# Patient Record
Sex: MALE | Race: WHITE | Employment: FULL TIME | URBAN - NONMETROPOLITAN AREA
[De-identification: names, ages, dates, MRNs, and addresses within clinical notes are randomized per-mention and may not be internally consistent; named-entity substitution may affect disease eponyms.]

---

## 2024-03-28 ENCOUNTER — APPOINTMENT (OUTPATIENT)
Dept: MRI IMAGING | Age: 24
DRG: 123 | End: 2024-03-28
Payer: COMMERCIAL

## 2024-03-28 ENCOUNTER — HOSPITAL ENCOUNTER (INPATIENT)
Age: 24
LOS: 2 days | Discharge: HOME | DRG: 123 | End: 2024-03-30
Attending: EMERGENCY MEDICINE | Admitting: HOSPITALIST
Payer: COMMERCIAL

## 2024-03-28 DIAGNOSIS — G35 MULTIPLE SCLEROSIS (HCC): ICD-10-CM

## 2024-03-28 DIAGNOSIS — H46.9 OPTIC NEURITIS, LEFT: ICD-10-CM

## 2024-03-28 DIAGNOSIS — H53.132 ACUTE LOSS OF VISION, LEFT: Primary | ICD-10-CM

## 2024-03-28 LAB
ALBUMIN SERPL-MCNC: 4.7 G/DL (ref 3.5–5.2)
ALP SERPL-CCNC: 76 U/L (ref 40–130)
ALT SERPL-CCNC: 39 U/L (ref 5–41)
ANION GAP SERPL CALCULATED.3IONS-SCNC: 10 MMOL/L (ref 7–19)
AST SERPL-CCNC: 22 U/L (ref 5–40)
BASOPHILS # BLD: 0.1 K/UL (ref 0–0.2)
BASOPHILS NFR BLD: 1.5 % (ref 0–1)
BILIRUB SERPL-MCNC: 0.3 MG/DL (ref 0.2–1.2)
BUN SERPL-MCNC: 11 MG/DL (ref 6–20)
CALCIUM SERPL-MCNC: 9.4 MG/DL (ref 8.6–10)
CHLORIDE SERPL-SCNC: 105 MMOL/L (ref 98–111)
CO2 SERPL-SCNC: 25 MMOL/L (ref 22–29)
CREAT SERPL-MCNC: 0.7 MG/DL (ref 0.5–1.2)
EOSINOPHIL # BLD: 0.2 K/UL (ref 0–0.6)
EOSINOPHIL NFR BLD: 3.2 % (ref 0–5)
ERYTHROCYTE [DISTWIDTH] IN BLOOD BY AUTOMATED COUNT: 12.4 % (ref 11.5–14.5)
GLUCOSE SERPL-MCNC: 105 MG/DL (ref 74–109)
HCT VFR BLD AUTO: 47.6 % (ref 42–52)
HGB BLD-MCNC: 15.9 G/DL (ref 14–18)
IMM GRANULOCYTES # BLD: 0 K/UL
LYMPHOCYTES # BLD: 1.9 K/UL (ref 1.1–4.5)
LYMPHOCYTES NFR BLD: 36 % (ref 20–40)
MCH RBC QN AUTO: 29 PG (ref 27–31)
MCHC RBC AUTO-ENTMCNC: 33.4 G/DL (ref 33–37)
MCV RBC AUTO: 86.7 FL (ref 80–94)
MONOCYTES # BLD: 0.5 K/UL (ref 0–0.9)
MONOCYTES NFR BLD: 8.6 % (ref 0–10)
NEUTROPHILS # BLD: 2.7 K/UL (ref 1.5–7.5)
NEUTS SEG NFR BLD: 50.1 % (ref 50–65)
PLATELET # BLD AUTO: 248 K/UL (ref 130–400)
PMV BLD AUTO: 10.1 FL (ref 9.4–12.4)
POTASSIUM SERPL-SCNC: 3.6 MMOL/L (ref 3.5–5)
PROT SERPL-MCNC: 7.4 G/DL (ref 6.6–8.7)
RBC # BLD AUTO: 5.49 M/UL (ref 4.7–6.1)
SODIUM SERPL-SCNC: 140 MMOL/L (ref 136–145)
WBC # BLD AUTO: 5.3 K/UL (ref 4.8–10.8)

## 2024-03-28 PROCEDURE — 6360000002 HC RX W HCPCS: Performed by: HOSPITALIST

## 2024-03-28 PROCEDURE — 99285 EMERGENCY DEPT VISIT HI MDM: CPT

## 2024-03-28 PROCEDURE — 1200000000 HC SEMI PRIVATE

## 2024-03-28 PROCEDURE — 85025 COMPLETE CBC W/AUTO DIFF WBC: CPT

## 2024-03-28 PROCEDURE — 70543 MRI ORBT/FAC/NCK W/O &W/DYE: CPT

## 2024-03-28 PROCEDURE — 6360000004 HC RX CONTRAST MEDICATION: Performed by: EMERGENCY MEDICINE

## 2024-03-28 PROCEDURE — 80053 COMPREHEN METABOLIC PANEL: CPT

## 2024-03-28 PROCEDURE — C9113 INJ PANTOPRAZOLE SODIUM, VIA: HCPCS | Performed by: HOSPITALIST

## 2024-03-28 PROCEDURE — 2580000003 HC RX 258: Performed by: HOSPITALIST

## 2024-03-28 PROCEDURE — 36415 COLL VENOUS BLD VENIPUNCTURE: CPT

## 2024-03-28 PROCEDURE — A9577 INJ MULTIHANCE: HCPCS | Performed by: EMERGENCY MEDICINE

## 2024-03-28 RX ORDER — POTASSIUM CHLORIDE 7.45 MG/ML
10 INJECTION INTRAVENOUS PRN
Status: DISCONTINUED | OUTPATIENT
Start: 2024-03-28 | End: 2024-03-30 | Stop reason: HOSPADM

## 2024-03-28 RX ORDER — POLYETHYLENE GLYCOL 3350 17 G/17G
17 POWDER, FOR SOLUTION ORAL DAILY PRN
Status: DISCONTINUED | OUTPATIENT
Start: 2024-03-28 | End: 2024-03-30 | Stop reason: HOSPADM

## 2024-03-28 RX ORDER — MAGNESIUM SULFATE IN WATER 40 MG/ML
2000 INJECTION, SOLUTION INTRAVENOUS PRN
Status: DISCONTINUED | OUTPATIENT
Start: 2024-03-28 | End: 2024-03-30 | Stop reason: HOSPADM

## 2024-03-28 RX ORDER — POTASSIUM CHLORIDE 20 MEQ/1
40 TABLET, EXTENDED RELEASE ORAL PRN
Status: DISCONTINUED | OUTPATIENT
Start: 2024-03-28 | End: 2024-03-30 | Stop reason: HOSPADM

## 2024-03-28 RX ORDER — SODIUM CHLORIDE 0.9 % (FLUSH) 0.9 %
5-40 SYRINGE (ML) INJECTION EVERY 12 HOURS SCHEDULED
Status: DISCONTINUED | OUTPATIENT
Start: 2024-03-28 | End: 2024-03-30 | Stop reason: HOSPADM

## 2024-03-28 RX ORDER — SODIUM CHLORIDE 9 MG/ML
INJECTION, SOLUTION INTRAVENOUS PRN
Status: DISCONTINUED | OUTPATIENT
Start: 2024-03-28 | End: 2024-03-30 | Stop reason: HOSPADM

## 2024-03-28 RX ORDER — ONDANSETRON 2 MG/ML
4 INJECTION INTRAMUSCULAR; INTRAVENOUS EVERY 6 HOURS PRN
Status: DISCONTINUED | OUTPATIENT
Start: 2024-03-28 | End: 2024-03-30 | Stop reason: HOSPADM

## 2024-03-28 RX ORDER — ACETAMINOPHEN 650 MG/1
650 SUPPOSITORY RECTAL EVERY 6 HOURS PRN
Status: DISCONTINUED | OUTPATIENT
Start: 2024-03-28 | End: 2024-03-30 | Stop reason: HOSPADM

## 2024-03-28 RX ORDER — SODIUM CHLORIDE 0.9 % (FLUSH) 0.9 %
5-40 SYRINGE (ML) INJECTION PRN
Status: DISCONTINUED | OUTPATIENT
Start: 2024-03-28 | End: 2024-03-30 | Stop reason: HOSPADM

## 2024-03-28 RX ORDER — ACETAMINOPHEN 325 MG/1
650 TABLET ORAL EVERY 6 HOURS PRN
Status: DISCONTINUED | OUTPATIENT
Start: 2024-03-28 | End: 2024-03-30 | Stop reason: HOSPADM

## 2024-03-28 RX ORDER — ENOXAPARIN SODIUM 100 MG/ML
30 INJECTION SUBCUTANEOUS 2 TIMES DAILY
Status: DISCONTINUED | OUTPATIENT
Start: 2024-03-28 | End: 2024-03-29

## 2024-03-28 RX ORDER — ONDANSETRON 4 MG/1
4 TABLET, ORALLY DISINTEGRATING ORAL EVERY 8 HOURS PRN
Status: DISCONTINUED | OUTPATIENT
Start: 2024-03-28 | End: 2024-03-30 | Stop reason: HOSPADM

## 2024-03-28 RX ADMIN — GADOBENATE DIMEGLUMINE 20 ML: 529 INJECTION, SOLUTION INTRAVENOUS at 17:28

## 2024-03-28 RX ADMIN — SODIUM CHLORIDE, PRESERVATIVE FREE 40 MG: 5 INJECTION INTRAVENOUS at 17:50

## 2024-03-28 ASSESSMENT — PAIN - FUNCTIONAL ASSESSMENT: PAIN_FUNCTIONAL_ASSESSMENT: NONE - DENIES PAIN

## 2024-03-28 ASSESSMENT — ENCOUNTER SYMPTOMS
SHORTNESS OF BREATH: 0
VOMITING: 0
ABDOMINAL PAIN: 0
ABDOMINAL DISTENTION: 0
COUGH: 0

## 2024-03-28 NOTE — H&P
HISTORY AND PHYSICAL               Date: 3/28/2024        Patient Name: Fuentes Short     YOB: 2000      Age:  23 y.o.      Chief Complaint     Chief Complaint   Patient presents with    Loss of Vision     Left eye loss of vision x a week, denies other symptoms. Seen at eye dr and told he has an inflamed optic nerve        History Obtained From   patient    History of Present Illness     Very pleasant 23-year-old male with no significant past medical history presenting to the hospital with concerns of left eye vision loss.  Patient stated symptoms started a week ago, was evaluated at outside facility emergency room on Friday and due to concerns of vision loss he was set up emergently to send ophthalmologist outpatient.  Patient saw ophthalmologist on Saturday, did some workup and studies done and concern for inflamed optic nerve.  Patient stated his vision progressively became worse, presented to the emergency room for further evaluation.  In the emergency room visual acuity test was done and patient unable to see from left thigh, emergency room physician discussed with neurologist who recommended high-dose steroids for concerns of optic neuritis, MRI of the brain as well as orbits and admitted for further evaluation.  Patient denies any family history of neurological disorder or MS.      Past Medical History   History reviewed. No pertinent past medical history.   None      Past Surgical History     Past Surgical History:   Procedure Laterality Date    KNEE SURGERY Left         Medications Prior to Admission     Prior to Admission medications    Not on File   None    Allergies   Patient has no known allergies.    Social History     Social History       Tobacco History       Smoking Status  Never      Smokeless Tobacco Use  Never              Alcohol History       Alcohol Use Status  Not Currently              Drug Use       Drug Use Status  Not Currently              Sexual Activity

## 2024-03-28 NOTE — PROGRESS NOTES
Visual acuity test performed. With both eyes pt was able to read up to line 8 without difficulty. Pt was then instructed to use his left eye only. Pt states that he was not able to read any of the lines on the chart. Pt states that it was too blurry.

## 2024-03-28 NOTE — ED PROVIDER NOTES
Pulmonary effort is normal. No respiratory distress.      Breath sounds: Normal breath sounds. No stridor.   Abdominal:      General: There is no distension.      Palpations: Abdomen is soft.      Tenderness: There is no abdominal tenderness. There is no guarding.   Skin:     Coloration: Skin is not pale.      Findings: No rash.   Neurological:      Mental Status: He is alert and oriented to person, place, and time.   Psychiatric:         Behavior: Behavior is cooperative.         DIAGNOSTIC RESULTS       LABS:  Labs Reviewed   CBC WITH AUTO DIFFERENTIAL - Abnormal; Notable for the following components:       Result Value    Basophils % 1.5 (*)     All other components within normal limits   COMPREHENSIVE METABOLIC PANEL       All other labs were within normal range or not returned as of this dictation.    EMERGENCY DEPARTMENT COURSE and DIFFERENTIAL DIAGNOSIS/MDM:   Vitals:    Vitals:    03/28/24 1039   BP: (!) 145/84   Pulse: 73   Resp: 15   Temp: 98.1 °F (36.7 °C)   TempSrc: Oral   SpO2: 100%   Weight: 115.7 kg (255 lb)   Height: 1.905 m (6' 3\")       MDM    I have independently reviewed patient's outpatient records from the ophthalmologist.      CONSULTS:    Case was discussed with Dr. Díaz regarding neurology consultation.  Has recommended admission to the hospitalist service with plans for MRI of the brain and orbits and high-dose IV steroids.    Case was discussed with Nik regarding admission to the hospitalist service.    PROCEDURES:  Unless otherwise noted below, none     Procedures    FINAL IMPRESSION      1. Acute loss of vision, left    2. Optic neuritis, left          DISPOSITION/PLAN   DISPOSITION Decision To Admit 03/28/2024 03:01:14 PM        (Please note that portions of this note were completed with a voice recognition program.  Efforts were made to edit thedictations but occasionally words are mis-transcribed.)    Torsten Conner MD (electronically signed)  Attending Emergency Physician           Torsten Conner MD  03/28/24 8249

## 2024-03-28 NOTE — PROGRESS NOTES
ED TO INPATIENT SBAR HANDOFF    Patient Name: Fuentes Short   : 2000  23 y.o.   Family/Caregiver Present: No  Code Status Order: No Order    C-SSRS: Risk of Suicide: No Risk  Sitter No  Restraints:         Situation  Chief Complaint:   Chief Complaint   Patient presents with    Loss of Vision     Left eye loss of vision x a week, denies other symptoms. Seen at eye dr and told he has an inflamed optic nerve     Patient Diagnosis: Optic neuritis [H46.9]     Brief Description of Patient's Condition: pt having difficulty with vision in left eye. Pt reports this has been going on for approximately 1 week. Pt states he has seen been seen previously at an outside facility regarding a possible inflamed optic nerve.   Mental Status: oriented, alert, coherent, logical, thought processes intact, and able to concentrate and follow conversation  Arrived from: home    Imaging:   MRI ORBITS FACE NECK W WO CONTRAST    (Results Pending)     COVID-19 Results:   Internal Administration   First Dose      Second Dose           Last COVID Lab No results found for: \"SARS-COV-2\", \"SARS-COV-2 RNA\", \"SARS-COV-2 RNA, RT PCR\", \"SARS-COV-2, MAXI\", \"SARS-COV-2, NAAT\", \"SARS-COV-2 BY PCR\", \"POC\", \"SARS-COV-2, POC\", \"RAPID\", \"SARS-COV-2, RAPID\", \"SALIVA\", \"SARS-COV-2, SALIVA\"        Abnormal labs:   Abnormal Labs Reviewed   CBC WITH AUTO DIFFERENTIAL - Abnormal; Notable for the following components:       Result Value    Basophils % 1.5 (*)     All other components within normal limits     Background  Allergies: No Known Allergies  Current Medications:     History: History reviewed. No pertinent past medical history.    Assessment  Vitals: Level of Consciousness: Alert (0)   Vitals:    24 1039   BP: (!) 145/84   Pulse: 73   Resp: 15   Temp: 98.1 °F (36.7 °C)   TempSrc: Oral   SpO2: 100%   Weight: 115.7 kg (255 lb)   Height: 1.905 m (6' 3\")     Predictive Model Details          14 (Normal)  Factor Value    Calculated 3/28/2024

## 2024-03-28 NOTE — PROGRESS NOTES
4 Eyes Skin Assessment     NAME:  Fuentes Short  YOB: 2000  MEDICAL RECORD NUMBER:  [id number]    The patient is being assessed for  Admission    I agree that at least one RN has performed a thorough Head to Toe Skin Assessment on the patient. ALL assessment sites listed below have been assessed.      Areas assessed by both nurses:    Head, Face, Ears, Shoulders, Back, Chest, Arms, Elbows, Hands, Sacrum. Buttock, Coccyx, Ischium, and Legs. Feet and Heels        Does the Patient have a Wound? No noted wound(s)       John Prevention initiated by RN: No  Wound Care Orders initiated by RN: No    Pressure Injury (Stage 3,4, Unstageable, DTI, NWPT, and Complex wounds) if present, place Wound referral order by RN under : No    New Ostomies, if present place, Ostomy referral order under : No     Nurse 1 eSignature: Electronically signed by Du Marcos RN on 3/28/24 at 5:01 PM CDT    **SHARE this note so that the co-signing nurse can place an eSignature**    Nurse 2 eSignature: {Esignature:[id number]}

## 2024-03-29 PROBLEM — H53.9 VISUAL DISTURBANCE: Status: ACTIVE | Noted: 2024-03-29

## 2024-03-29 PROBLEM — G35 MULTIPLE SCLEROSIS (HCC): Status: ACTIVE | Noted: 2024-03-29

## 2024-03-29 LAB
ANION GAP SERPL CALCULATED.3IONS-SCNC: 14 MMOL/L (ref 7–19)
APTT PPP: 26.1 SEC (ref 26–36.2)
BASOPHILS # BLD: 0 K/UL (ref 0–0.2)
BASOPHILS NFR BLD: 0.1 % (ref 0–1)
BUN SERPL-MCNC: 11 MG/DL (ref 6–20)
CALCIUM SERPL-MCNC: 9.5 MG/DL (ref 8.6–10)
CHLORIDE SERPL-SCNC: 103 MMOL/L (ref 98–111)
CO2 SERPL-SCNC: 23 MMOL/L (ref 22–29)
CREAT SERPL-MCNC: 0.8 MG/DL (ref 0.5–1.2)
EOSINOPHIL # BLD: 0 K/UL (ref 0–0.6)
EOSINOPHIL NFR BLD: 0.1 % (ref 0–5)
ERYTHROCYTE [DISTWIDTH] IN BLOOD BY AUTOMATED COUNT: 12.2 % (ref 11.5–14.5)
ERYTHROCYTE [SEDIMENTATION RATE] IN BLOOD BY WESTERGREN METHOD: 2 MM/HR (ref 0–10)
GLUCOSE SERPL-MCNC: 164 MG/DL (ref 74–109)
HBA1C MFR BLD: 5 % (ref 4–6)
HCT VFR BLD AUTO: 44.7 % (ref 42–52)
HGB BLD-MCNC: 15.2 G/DL (ref 14–18)
IMM GRANULOCYTES # BLD: 0 K/UL
INR PPP: 0.98 (ref 0.88–1.18)
LYMPHOCYTES # BLD: 0.7 K/UL (ref 1.1–4.5)
LYMPHOCYTES NFR BLD: 7.6 % (ref 20–40)
MCH RBC QN AUTO: 29.5 PG (ref 27–31)
MCHC RBC AUTO-ENTMCNC: 34 G/DL (ref 33–37)
MCV RBC AUTO: 86.8 FL (ref 80–94)
MONOCYTES # BLD: 0.1 K/UL (ref 0–0.9)
MONOCYTES NFR BLD: 0.5 % (ref 0–10)
NEUTROPHILS # BLD: 9 K/UL (ref 1.5–7.5)
NEUTS SEG NFR BLD: 91.3 % (ref 50–65)
PLATELET # BLD AUTO: 260 K/UL (ref 130–400)
PMV BLD AUTO: 9.8 FL (ref 9.4–12.4)
POTASSIUM SERPL-SCNC: 4.1 MMOL/L (ref 3.5–5)
PROTHROMBIN TIME: 12.7 SEC (ref 12–14.6)
RBC # BLD AUTO: 5.15 M/UL (ref 4.7–6.1)
RHEUMATOID FACT SER NEPH-ACNC: <10 IU/ML
SODIUM SERPL-SCNC: 140 MMOL/L (ref 136–145)
SPECIMEN SOURCE: NORMAL
T4 FREE SERPL-MCNC: 1.14 NG/DL (ref 0.93–1.7)
TSH SERPL DL<=0.005 MIU/L-ACNC: 0.38 UIU/ML (ref 0.27–4.2)
VIT B12 SERPL-MCNC: 592 PG/ML (ref 211–946)
WBC # BLD AUTO: 9.8 K/UL (ref 4.8–10.8)

## 2024-03-29 PROCEDURE — 87798 DETECT AGENT NOS DNA AMP: CPT

## 2024-03-29 PROCEDURE — 86618 LYME DISEASE ANTIBODY: CPT

## 2024-03-29 PROCEDURE — 84439 ASSAY OF FREE THYROXINE: CPT

## 2024-03-29 PROCEDURE — 36415 COLL VENOUS BLD VENIPUNCTURE: CPT

## 2024-03-29 PROCEDURE — 80048 BASIC METABOLIC PNL TOTAL CA: CPT

## 2024-03-29 PROCEDURE — 94760 N-INVAS EAR/PLS OXIMETRY 1: CPT

## 2024-03-29 PROCEDURE — 89050 BODY FLUID CELL COUNT: CPT

## 2024-03-29 PROCEDURE — 81241 F5 GENE: CPT

## 2024-03-29 PROCEDURE — 85025 COMPLETE CBC W/AUTO DIFF WBC: CPT

## 2024-03-29 PROCEDURE — 85610 PROTHROMBIN TIME: CPT

## 2024-03-29 PROCEDURE — 2580000003 HC RX 258: Performed by: PSYCHIATRY & NEUROLOGY

## 2024-03-29 PROCEDURE — 1200000000 HC SEMI PRIVATE

## 2024-03-29 PROCEDURE — 86431 RHEUMATOID FACTOR QUANT: CPT

## 2024-03-29 PROCEDURE — 85730 THROMBOPLASTIN TIME PARTIAL: CPT

## 2024-03-29 PROCEDURE — 82607 VITAMIN B-12: CPT

## 2024-03-29 PROCEDURE — 84425 ASSAY OF VITAMIN B-1: CPT

## 2024-03-29 PROCEDURE — 6360000002 HC RX W HCPCS: Performed by: PSYCHIATRY & NEUROLOGY

## 2024-03-29 PROCEDURE — 2580000003 HC RX 258: Performed by: HOSPITALIST

## 2024-03-29 PROCEDURE — C9113 INJ PANTOPRAZOLE SODIUM, VIA: HCPCS | Performed by: HOSPITALIST

## 2024-03-29 PROCEDURE — 84443 ASSAY THYROID STIM HORMONE: CPT

## 2024-03-29 PROCEDURE — 86160 COMPLEMENT ANTIGEN: CPT

## 2024-03-29 PROCEDURE — 83036 HEMOGLOBIN GLYCOSYLATED A1C: CPT

## 2024-03-29 PROCEDURE — 99223 1ST HOSP IP/OBS HIGH 75: CPT | Performed by: PSYCHIATRY & NEUROLOGY

## 2024-03-29 PROCEDURE — 6360000002 HC RX W HCPCS: Performed by: HOSPITALIST

## 2024-03-29 PROCEDURE — 86038 ANTINUCLEAR ANTIBODIES: CPT

## 2024-03-29 PROCEDURE — 6370000000 HC RX 637 (ALT 250 FOR IP): Performed by: HOSPITALIST

## 2024-03-29 PROCEDURE — 86225 DNA ANTIBODY NATIVE: CPT

## 2024-03-29 PROCEDURE — 85652 RBC SED RATE AUTOMATED: CPT

## 2024-03-29 PROCEDURE — 83921 ORGANIC ACID SINGLE QUANT: CPT

## 2024-03-29 RX ORDER — SODIUM CHLORIDE 0.9 % (FLUSH) 0.9 %
5-40 SYRINGE (ML) INJECTION EVERY 12 HOURS SCHEDULED
Status: DISCONTINUED | OUTPATIENT
Start: 2024-03-29 | End: 2024-03-30 | Stop reason: HOSPADM

## 2024-03-29 RX ORDER — SODIUM CHLORIDE 9 MG/ML
INJECTION, SOLUTION INTRAVENOUS PRN
Status: DISCONTINUED | OUTPATIENT
Start: 2024-03-29 | End: 2024-03-30 | Stop reason: HOSPADM

## 2024-03-29 RX ORDER — SODIUM CHLORIDE 0.9 % (FLUSH) 0.9 %
5-40 SYRINGE (ML) INJECTION PRN
Status: DISCONTINUED | OUTPATIENT
Start: 2024-03-29 | End: 2024-03-30 | Stop reason: HOSPADM

## 2024-03-29 RX ORDER — LORAZEPAM 2 MG/ML
2 INJECTION INTRAMUSCULAR ONCE
Status: DISCONTINUED | OUTPATIENT
Start: 2024-03-29 | End: 2024-03-30 | Stop reason: HOSPADM

## 2024-03-29 RX ORDER — NIFEDIPINE 30 MG/1
30 TABLET, EXTENDED RELEASE ORAL DAILY
Status: DISCONTINUED | OUTPATIENT
Start: 2024-03-29 | End: 2024-03-30 | Stop reason: HOSPADM

## 2024-03-29 RX ADMIN — METHYLPREDNISOLONE SODIUM SUCCINATE 1000 MG: 1 INJECTION INTRAMUSCULAR; INTRAVENOUS at 12:37

## 2024-03-29 RX ADMIN — NIFEDIPINE 30 MG: 30 TABLET, EXTENDED RELEASE ORAL at 10:03

## 2024-03-29 RX ADMIN — SODIUM CHLORIDE, PRESERVATIVE FREE 40 MG: 5 INJECTION INTRAVENOUS at 05:28

## 2024-03-29 RX ADMIN — SODIUM CHLORIDE, PRESERVATIVE FREE 10 ML: 5 INJECTION INTRAVENOUS at 21:00

## 2024-03-29 RX ADMIN — SODIUM CHLORIDE, PRESERVATIVE FREE 40 MG: 5 INJECTION INTRAVENOUS at 16:56

## 2024-03-29 NOTE — CONSULTS
[facility] Consult        Patient:   Fuentes Short  MR#:    [id number]  Account Number:                   [id number]      Room:    0519/519-01   YOB: 2000  Date of Progress Note: 3/29/2024  Time of Note                           7:49 AM  Attending Physician:  Danyell Yi MD  Consulting Physician:   Nithin Díaz M.D.        CHIEF COMPLAINT: Left optic neuritis      HISTORY OF PRESENT ILLNESS:   This 23 y.o. male with no significant past medical history seen for evaluation of left-sided optic neuritis.  Approximately 1 week prior to admission he woke up with blurring of vision in the left eye.  There was haziness not worsened with time.  He went to an ophthalmologist and workup appeared to be consistent with optic neuritis.  He denies some eye pain on the left with movement.  He denies diplopia, dysarthria, dysphagia, weakness, numbness, ataxia or confusion.  He denies any bowel or bladder issues.  He denies any previous history of neurological dysfunction.  He came in for evaluation.  There is no family history of neurological dysfunction.  There is no family history of MS.    REVIEW OF SYSTEMS:  Constitutional - No fever or chills.  No diaphoresis or significant fatigue.  HENT -  No tinnitus or significant hearing loss.  Eyes - no sudden vision change or eye pain  Respiratory - no significant shortness of breath or cough  Cardiovascular - no chest pain No palpitations or significant leg swelling  Gastrointestinal - no abdominal swelling or pain.    Genitourinary - No difficulty urinating, dysuria  Musculoskeletal - no back pain or myalgia.  Skin - no color change or rash  Neurologic - No seizures.  No lateralizing weakness.  Hematologic - no easy bruising or excessive bleeding.  Psychiatric - no severe anxiety or nervousness.   All other review of systems are negative.      Past Medical History:  History reviewed. No pertinent past medical history.    Past Surgical History:     Neurophysiology  Fellowship Trained in Clinical Neurophysiology    EMR Dragon/transcription disclaimer:Significant part of this  encounter note is electronic transcription/translation of spoken language to printed text. The electronic translation of spoken language may be erroneous, or at times, nonsensical words or phrases may be inadvertently transcribed. Although I have reviewed the note for such errors, some may still exist.

## 2024-03-29 NOTE — PROGRESS NOTES
Progress Note    Date:3/29/2024       Room:0519/519-01  Patient Name:Fuentes Short     YOB: 2000     Age:23 y.o.      Subjective    Subjective: 23-year-old male with no significant past medical history presenting to the hospital with concerns of left eye vision loss. Patient saw ophthalmologist on Saturday, did some workup and studies done and concern for inflamed optic nerve. In the emergency room visual acuity test was done and patient unable to see from left eye, concerns for optic neuritis high-dose steroids was given. MRI of the brain / orbits obtained and revealed left optic neuritis with suspicion of MS. Evaluated by neurology, patient will need IV steroids for 3 days, LP and further workup.    Seen in house this morning no family member present, denied any acute overnight event, still having issues with vision from left eye.  No other new issues at this time.  Currently awaiting LP.     Review of Systems: 12 point system review negative except as stated above.    Objective         Vitals Last 24 Hours:  TEMPERATURE:  Temp  Av.9 °F (36.6 °C)  Min: 97.6 °F (36.4 °C)  Max: 98.1 °F (36.7 °C)  RESPIRATIONS RANGE: Resp  Av  Min: 16  Max: 18  PULSE OXIMETRY RANGE: SpO2  Av.8 %  Min: 95 %  Max: 99 %  PULSE RANGE: Pulse  Av.8  Min: 55  Max: 116  BLOOD PRESSURE RANGE: Systolic (24hrs), Av , Min:132 , Max:165   ; Diastolic (24hrs), Av, Min:68, Max:82    I/O (24Hr):    Intake/Output Summary (Last 24 hours) at 3/29/2024 1122  Last data filed at 3/29/2024 1006  Gross per 24 hour   Intake 360 ml   Output --   Net 360 ml       Physical Examination:  General: Well-developed, no acute distress lying comfortably in bed.  HEENT: Atraumatic normocephalic, range of motion normal, no JVD, no tracheal deviation noted.  Cardiac: Normal S1-S2 no murmurs rub or gallop.  Respiratory: clear To auscultation bilaterally, no rhonchi or rales, no wheezing  Abdomen: Soft, positive bowel sounds

## 2024-03-29 NOTE — PROGRESS NOTES
This RN notified at 1230 by radiology that pt did not have coag studies completed that were required prior to LP. Stat labs ordered at that time. See lab results timed 1320. Pt informed at time of second lab draw today that labs were required prior to LP. Pt and mother in agreement with plan to proceed with LP today.   RN contacted at 1450 that patient cannot have LP today due to late results of coag studies and that radiologist was leaving early for personal reasons.   RN contacted Dr. Yi at 1455 that LP cannot be completed today and notified Dr. Díaz at 1455 of above information.   Patient and his mother verbalized understanding and made it known that they are nervous about diagnosis and are hoping to have LP prior to DC so that they can have a better understanding of how to proceed with any new diagnosis.

## 2024-03-29 NOTE — PROGRESS NOTES
Spoke with nurse regarding PT, PTT, and INR orders at 1217. She put these orders in STAT. Results came back around 1420. I noticed the results at 1430. The radiologist had to leave early today for a doctor's appointment and is not available to do the lumbar puncture.

## 2024-03-30 VITALS
WEIGHT: 255 LBS | TEMPERATURE: 98.1 F | RESPIRATION RATE: 16 BRPM | HEART RATE: 73 BPM | DIASTOLIC BLOOD PRESSURE: 90 MMHG | BODY MASS INDEX: 31.71 KG/M2 | OXYGEN SATURATION: 100 % | SYSTOLIC BLOOD PRESSURE: 148 MMHG | HEIGHT: 75 IN

## 2024-03-30 LAB
ANION GAP SERPL CALCULATED.3IONS-SCNC: 16 MMOL/L (ref 7–19)
APPEARANCE CSF: CLEAR
BASOPHILS # BLD: 0 K/UL (ref 0–0.2)
BASOPHILS NFR BLD: 0.1 % (ref 0–1)
BUN SERPL-MCNC: 11 MG/DL (ref 6–20)
C3 SERPL-MCNC: 97 MG/DL (ref 90–180)
C4 SERPL-MCNC: 25 MG/DL (ref 10–40)
CALCIUM SERPL-MCNC: 9.4 MG/DL (ref 8.6–10)
CHLORIDE SERPL-SCNC: 107 MMOL/L (ref 98–111)
CLOT EVALUATION CSF: NORMAL
CO2 SERPL-SCNC: 20 MMOL/L (ref 22–29)
COLOR CSF: COLORLESS
CREAT SERPL-MCNC: 0.7 MG/DL (ref 0.5–1.2)
CRYPTOC AG CSF QL: NEGATIVE
EOSINOPHIL # BLD: 0 K/UL (ref 0–0.6)
EOSINOPHIL NFR BLD: 0 % (ref 0–5)
ERYTHROCYTE [DISTWIDTH] IN BLOOD BY AUTOMATED COUNT: 12.5 % (ref 11.5–14.5)
GLUCOSE CSF-MCNC: 110 MG/DL (ref 40–70)
GLUCOSE SERPL-MCNC: 259 MG/DL (ref 74–109)
HCT VFR BLD AUTO: 46 % (ref 42–52)
HGB BLD-MCNC: 15.1 G/DL (ref 14–18)
IMM GRANULOCYTES # BLD: 0.2 K/UL
KOH PREP SPEC: NORMAL
LYMPHOCYTES # BLD: 0.9 K/UL (ref 1.1–4.5)
LYMPHOCYTES NFR BLD: 6.1 % (ref 20–40)
MANUAL DIF COMMENT CSF-IMP: NORMAL
MCH RBC QN AUTO: 28.8 PG (ref 27–31)
MCHC RBC AUTO-ENTMCNC: 32.8 G/DL (ref 33–37)
MCV RBC AUTO: 87.8 FL (ref 80–94)
MONOCYTES # BLD: 0.7 K/UL (ref 0–0.9)
MONOCYTES NFR BLD: 4.7 % (ref 0–10)
NEUTROPHILS # BLD: 12.6 K/UL (ref 1.5–7.5)
NEUTS SEG NFR BLD: 87.8 % (ref 50–65)
NUCLEAR IGG SER QL IA: NORMAL
PLATELET # BLD AUTO: 243 K/UL (ref 130–400)
PMV BLD AUTO: 10 FL (ref 9.4–12.4)
POTASSIUM SERPL-SCNC: 4.1 MMOL/L (ref 3.5–5)
PROT CSF-MCNC: 39 MG/DL (ref 15–45)
RBC # BLD AUTO: 5.24 M/UL (ref 4.7–6.1)
RBC CSF: 1 /CUMM (ref 0–5)
RHEUMATOID FACT SER NEPH-ACNC: <10 IU/ML (ref 0–14)
SODIUM SERPL-SCNC: 143 MMOL/L (ref 136–145)
SPECIMEN SOURCE: NORMAL
TUBE # CSF: NORMAL
WBC # BLD AUTO: 14.4 K/UL (ref 4.8–10.8)
WBC CSF: 1 /CUMM (ref 0–8)

## 2024-03-30 PROCEDURE — 86651 ENCEPHALITIS CALIFORN ANTBDY: CPT

## 2024-03-30 PROCEDURE — 82042 OTHER SOURCE ALBUMIN QUAN EA: CPT

## 2024-03-30 PROCEDURE — 99233 SBSQ HOSP IP/OBS HIGH 50: CPT | Performed by: PSYCHIATRY & NEUROLOGY

## 2024-03-30 PROCEDURE — 6370000000 HC RX 637 (ALT 250 FOR IP): Performed by: HOSPITALIST

## 2024-03-30 PROCEDURE — 80048 BASIC METABOLIC PNL TOTAL CA: CPT

## 2024-03-30 PROCEDURE — 87205 SMEAR GRAM STAIN: CPT

## 2024-03-30 PROCEDURE — 84157 ASSAY OF PROTEIN OTHER: CPT

## 2024-03-30 PROCEDURE — 6360000002 HC RX W HCPCS: Performed by: HOSPITALIST

## 2024-03-30 PROCEDURE — 82040 ASSAY OF SERUM ALBUMIN: CPT

## 2024-03-30 PROCEDURE — 36415 COLL VENOUS BLD VENIPUNCTURE: CPT

## 2024-03-30 PROCEDURE — 87899 AGENT NOS ASSAY W/OPTIC: CPT

## 2024-03-30 PROCEDURE — 87102 FUNGUS ISOLATION CULTURE: CPT

## 2024-03-30 PROCEDURE — 2580000003 HC RX 258: Performed by: HOSPITALIST

## 2024-03-30 PROCEDURE — 82945 GLUCOSE OTHER FLUID: CPT

## 2024-03-30 PROCEDURE — 87075 CULTR BACTERIA EXCEPT BLOOD: CPT

## 2024-03-30 PROCEDURE — 86789 WEST NILE VIRUS ANTIBODY: CPT

## 2024-03-30 PROCEDURE — 82784 ASSAY IGA/IGD/IGG/IGM EACH: CPT

## 2024-03-30 PROCEDURE — 2580000003 HC RX 258: Performed by: PSYCHIATRY & NEUROLOGY

## 2024-03-30 PROCEDURE — 009U3ZX DRAINAGE OF SPINAL CANAL, PERCUTANEOUS APPROACH, DIAGNOSTIC: ICD-10-PCS | Performed by: INTERNAL MEDICINE

## 2024-03-30 PROCEDURE — C9113 INJ PANTOPRAZOLE SODIUM, VIA: HCPCS | Performed by: HOSPITALIST

## 2024-03-30 PROCEDURE — 86654 ENCEPHALTIS WEST EQNE ANTBDY: CPT

## 2024-03-30 PROCEDURE — 62270 DX LMBR SPI PNXR: CPT | Performed by: INTERNAL MEDICINE

## 2024-03-30 PROCEDURE — 85025 COMPLETE CBC W/AUTO DIFF WBC: CPT

## 2024-03-30 PROCEDURE — 86652 ENCEPHALTIS EAST EQNE ANBDY: CPT

## 2024-03-30 PROCEDURE — 83916 OLIGOCLONAL BANDS: CPT

## 2024-03-30 PROCEDURE — 6360000002 HC RX W HCPCS: Performed by: PSYCHIATRY & NEUROLOGY

## 2024-03-30 PROCEDURE — 86653 ENCEPHALTIS ST LOUIS ANTBODY: CPT

## 2024-03-30 PROCEDURE — 87070 CULTURE OTHR SPECIMN AEROBIC: CPT

## 2024-03-30 RX ORDER — METHYLPREDNISOLONE 4 MG/1
TABLET ORAL
Qty: 1 KIT | Refills: 0 | Status: SHIPPED | OUTPATIENT
Start: 2024-03-30 | End: 2024-03-30

## 2024-03-30 RX ORDER — METHYLPREDNISOLONE 4 MG/1
TABLET ORAL
Qty: 1 KIT | Refills: 0 | Status: SHIPPED | OUTPATIENT
Start: 2024-03-30 | End: 2024-04-05

## 2024-03-30 RX ORDER — NIFEDIPINE 30 MG/1
30 TABLET, EXTENDED RELEASE ORAL DAILY
Qty: 30 TABLET | Refills: 3 | Status: SHIPPED | OUTPATIENT
Start: 2024-03-30

## 2024-03-30 RX ADMIN — METHYLPREDNISOLONE SODIUM SUCCINATE 1000 MG: 1 INJECTION INTRAMUSCULAR; INTRAVENOUS at 10:28

## 2024-03-30 RX ADMIN — SODIUM CHLORIDE, PRESERVATIVE FREE 10 ML: 5 INJECTION INTRAVENOUS at 06:37

## 2024-03-30 RX ADMIN — SODIUM CHLORIDE, PRESERVATIVE FREE 40 MG: 5 INJECTION INTRAVENOUS at 06:37

## 2024-03-30 RX ADMIN — SODIUM CHLORIDE, PRESERVATIVE FREE 10 ML: 5 INJECTION INTRAVENOUS at 10:33

## 2024-03-30 RX ADMIN — NIFEDIPINE 30 MG: 30 TABLET, EXTENDED RELEASE ORAL at 10:27

## 2024-03-30 NOTE — PROCEDURES
PROCEDURE NOTE  Date: 3/30/2024   Name: Fuentes Short  YOB: 2000    Lumbar Puncture    Date/Time: 3/30/2024 12:19 PM    Performed by: Afia Rodriguez MD  Authorized by: Nithin Díaz MD  Consent: Verbal consent obtained. Written consent obtained.  Risks and benefits: risks, benefits and alternatives were discussed  Consent given by: patient  Patient understanding: patient states understanding of the procedure being performed  Patient consent: the patient's understanding of the procedure matches consent given  Procedure consent: procedure consent matches procedure scheduled  Relevant documents: relevant documents present and verified  Test results: test results available and properly labeled  Site marked: the operative site was marked  Imaging studies: imaging studies available  Required items: required blood products, implants, devices, and special equipment available  Patient identity confirmed: verbally with patient and hospital-assigned identification number  Indications: evaluation for infection (also clinical and MRI concern for Multiple Sclerosis new diagnosis.)  Anesthesia: local infiltration    Anesthesia:  Local Anesthetic: lidocaine 1% without epinephrine  Anesthetic total: 3 mL    Sedation:  Patient sedated: no    Preparation: Patient was prepped and draped in the usual sterile fashion.  Lumbar space: L3-L4 interspace  Patient's position: left lateral decubitus  Needle gauge: 18  Needle type: spinal needle - Quincke tip  Needle length: 2.5 in  Number of attempts: 2  Fluid appearance: clear and blood-tinged then clearing  Tubes of fluid: 4  Total volume: 29 ml  Post-procedure: site cleaned, pressure dressing applied and adhesive bandage applied  Patient tolerance: patient tolerated the procedure well with no immediate complications

## 2024-03-30 NOTE — PLAN OF CARE
Problem: Discharge Planning  Goal: Discharge to home or other facility with appropriate resources  3/30/2024 1419 by Gina Berger RN  Outcome: Progressing  3/30/2024 0249 by Cindy Santana RN  Outcome: Progressing     Problem: Safety - Adult  Goal: Free from fall injury  3/30/2024 1419 by Gina Berger RN  Outcome: Progressing  3/30/2024 0249 by Cindy Santana, RN  Outcome: Progressing     Problem: Neurosensory - Adult  Goal: Achieves stable or improved neurological status  3/30/2024 1419 by Gina Berger RN  Outcome: Progressing  3/30/2024 0249 by Cindy Santana, RN  Outcome: Progressing

## 2024-03-30 NOTE — DISCHARGE SUMMARY
Discharge Summary    Date:3/30/2024        Patient Name:Fuentes Short     YOB: 2000     Age:23 y.o.    Admit Date:3/28/2024   Admission Condition:fair   Discharged Condition:fair  Discharge Date: 03/30/24       Discharge Diagnoses   Principal Problem:    Optic neuritis  Active Problems:    Multiple sclerosis (HCC)    Visual disturbance  Resolved Problems:    * No resolved hospital problems. *      Hospital Stay   Narrative of Hospital Course:     23-year-old male with no significant past medical history presenting to the hospital with concerns of left eye vision loss. Patient saw ophthalmologist prior to presentation, did some workup and studies and concern for inflamed optic nerve. In the emergency room visual acuity test was done and patient unable to see from left eye, concerns for optic neuritis high-dose steroids was given. MRI of the brain / orbits obtained and revealed left optic neuritis with suspicion of MS. Evaluated by neurology, patient completed IV steroids for 3 days, LP done by Hospitalist (Dr HERRERA). Patent discharged home to follow up with neurology outpt for LP results, and initiation of tx for MS. Discharged on medrol dose pack. Was started on nifedipine for increased BP in house. To follow up with PCP.      Physical Examination:  General: Well-developed, no acute distress lying comfortably in bed.  HEENT: Atraumatic normocephalic, range of motion normal, no JVD, no tracheal deviation noted.  Cardiac: Normal S1-S2 no murmurs rub or gallop.  Respiratory: clear To auscultation bilaterally, no rhonchi or rales, no wheezing  Abdomen: Soft, positive bowel sounds in all quadrants, no distention, nontender to palpation, no organomegaly noted, no rebound noted.    extremities: no tenderness, no edema, moves all extremities  Psych: Affect normal and good eye contact, behavioral normal.  Neuro No focal deficit except left eye vision loss        Consultants:   IP CONSULT TO NEUROLOGY  IP

## 2024-03-30 NOTE — PROGRESS NOTES
Patient:   Fuentes Short  MR#:    [id number]   Room:    19/519-01   YOB: 2000  Date of Progress Note: 3/30/2024  Time of Note                           8:17 AM  Consulting Physician:   Nithin Díaz M.D.  Attending Physician:  Danyell Yi MD     Chief complaint Left optic neuritis     S:This 23 y.o. male  with no significant past medical history seen for evaluation of left-sided optic neuritis.  Approximately 1 week prior to admission he woke up with blurring of vision in the left eye.  There was haziness not worsened with time.  He went to an ophthalmologist and workup appeared to be consistent with optic neuritis.  He denies some eye pain on the left with movement.  He denies diplopia, dysarthria, dysphagia, weakness, numbness, ataxia or confusion.  He denies any bowel or bladder issues.  He denies any previous history of neurological dysfunction.  He came in for evaluation.  There is no family history of neurological dysfunction.  There is no family history of MS. no complaints overnight.  Feeling well.    REVIEW OF SYSTEMS:  Constitutional: No fevers No chills  Neck:No stiffness  Respiratory: No shortness of breath  Cardiovascular: No chest pain No palpitations  Gastrointestinal: No abdominal pain    Genitourinary: No Dysuria  Neurological: No headache, no confusion    Past Medical History:  History reviewed. No pertinent past medical history.    Past Surgical History:      Procedure Laterality Date    KNEE SURGERY Left        Medications in Hospital:      Current Facility-Administered Medications:     methylPREDNISolone sodium (SOLU-MEDROL) 1,000 mg in sodium chloride 0.9 % 250 mL IVPB, 1,000 mg, IntraVENous, Daily, Nithin Díaz MD, Stopped at 03/29/24 1307    LORazepam (ATIVAN) injection 2 mg, 2 mg, IntraVENous, Once, Nithin Díaz MD    sodium chloride flush 0.9 % injection 5-40 mL, 5-40 mL, IntraVENous, 2 times per day, Nithin Díaz MD, 10 mL at 03/29/24 2100    sodium

## 2024-03-31 LAB
B BURGDOR.VLSE1+PEPC10 AB SER IA-ACNC: 0.32 IV
BACTERIA CSF CULT: NORMAL
BACTERIA SPEC ANAEROBE CULT: NORMAL
GRAM STN SPEC: NORMAL

## 2024-04-01 ENCOUNTER — TELEPHONE (OUTPATIENT)
Dept: NEUROLOGY | Age: 24
End: 2024-04-01

## 2024-04-01 LAB
F5 P.R506Q BLD/T QL: NEGATIVE
METHYLMALONATE SERPL-SCNC: <0.1 UMOL/L (ref 0–0.4)
SPECIMEN SOURCE: NORMAL

## 2024-04-01 NOTE — TELEPHONE ENCOUNTER
Fuentes's mom called to schedule an appointment for  HFU this week . [facility] was unable to accommodate in the time frame needed. Please be advised that the best time to call Anytime.    Thank you.

## 2024-04-02 LAB — VIT B1 SERPL-MCNC: 4 NMOL/L (ref 4–15)

## 2024-04-03 LAB
SPECIMEN SOURCE: NORMAL
VZV DNA SPEC QL NAA+PROBE: NOT DETECTED

## 2024-04-04 ENCOUNTER — OFFICE VISIT (OUTPATIENT)
Dept: NEUROLOGY | Age: 24
End: 2024-04-04
Payer: COMMERCIAL

## 2024-04-04 VITALS
HEIGHT: 75 IN | WEIGHT: 255 LBS | DIASTOLIC BLOOD PRESSURE: 94 MMHG | BODY MASS INDEX: 31.71 KG/M2 | HEART RATE: 87 BPM | SYSTOLIC BLOOD PRESSURE: 135 MMHG

## 2024-04-04 DIAGNOSIS — G35 MULTIPLE SCLEROSIS (HCC): Primary | ICD-10-CM

## 2024-04-04 DIAGNOSIS — Z09 HOSPITAL DISCHARGE FOLLOW-UP: ICD-10-CM

## 2024-04-04 DIAGNOSIS — H46.9 OPTIC NEURITIS: ICD-10-CM

## 2024-04-04 DIAGNOSIS — G35 MULTIPLE SCLEROSIS (HCC): ICD-10-CM

## 2024-04-04 DIAGNOSIS — H53.9 VISUAL DISTURBANCE: ICD-10-CM

## 2024-04-04 LAB
ALBUMIN SERPL-MCNC: 4.6 G/DL (ref 3.5–5.2)
ALP SERPL-CCNC: 71 U/L (ref 40–130)
ALT SERPL-CCNC: 49 U/L (ref 5–41)
ANION GAP SERPL CALCULATED.3IONS-SCNC: 11 MMOL/L (ref 7–19)
AST SERPL-CCNC: 14 U/L (ref 5–40)
BASOPHILS # BLD: 0.1 K/UL (ref 0–0.2)
BASOPHILS NFR BLD: 1 % (ref 0–1)
BILIRUB SERPL-MCNC: 0.6 MG/DL (ref 0.2–1.2)
BUN SERPL-MCNC: 18 MG/DL (ref 6–20)
CALCIUM SERPL-MCNC: 9.4 MG/DL (ref 8.6–10)
CHLORIDE SERPL-SCNC: 103 MMOL/L (ref 98–111)
CO2 SERPL-SCNC: 26 MMOL/L (ref 22–29)
CREAT SERPL-MCNC: 0.7 MG/DL (ref 0.5–1.2)
DSDNA IGG SER QL IA: 1.5 IU/ML
EOSINOPHIL # BLD: 0.2 K/UL (ref 0–0.6)
EOSINOPHIL NFR BLD: 1.3 % (ref 0–5)
ERYTHROCYTE [DISTWIDTH] IN BLOOD BY AUTOMATED COUNT: 13 % (ref 11.5–14.5)
GLUCOSE SERPL-MCNC: 100 MG/DL (ref 74–109)
HBV SURFACE AB SERPL IA-ACNC: NORMAL M[IU]/ML
HBV SURFACE AG SERPL QL IA: NORMAL
HCT VFR BLD AUTO: 53.3 % (ref 42–52)
HCV AB SERPL QL IA: NORMAL
HGB BLD-MCNC: 18.1 G/DL (ref 14–18)
IGG SERPL-MCNC: 1223 MG/DL (ref 700–1600)
IGM SERPL-MCNC: 92 MG/DL (ref 40–230)
IMM GRANULOCYTES # BLD: 0.5 K/UL
LYMPHOCYTES # BLD: 2.6 K/UL (ref 1.1–4.5)
LYMPHOCYTES NFR BLD: 21.2 % (ref 20–40)
Lab: NORMAL
MCH RBC QN AUTO: 29.4 PG (ref 27–31)
MCHC RBC AUTO-ENTMCNC: 34 G/DL (ref 33–37)
MCV RBC AUTO: 86.7 FL (ref 80–94)
MONOCYTES # BLD: 0.7 K/UL (ref 0–0.9)
MONOCYTES NFR BLD: 5.9 % (ref 0–10)
NEUTROPHILS # BLD: 8.3 K/UL (ref 1.5–7.5)
NEUTS SEG NFR BLD: 66.7 % (ref 50–65)
PLATELET # BLD AUTO: 289 K/UL (ref 130–400)
PMV BLD AUTO: 9.4 FL (ref 9.4–12.4)
POTASSIUM SERPL-SCNC: 4.6 MMOL/L (ref 3.5–5)
PROT SERPL-MCNC: 7.7 G/DL (ref 6.6–8.7)
RBC # BLD AUTO: 6.15 M/UL (ref 4.7–6.1)
REPORT: NORMAL
SODIUM SERPL-SCNC: 140 MMOL/L (ref 136–145)
THIS TEST SENT TO: NORMAL
WBC # BLD AUTO: 12.4 K/UL (ref 4.8–10.8)

## 2024-04-04 PROCEDURE — 99214 OFFICE O/P EST MOD 30 MIN: CPT | Performed by: PSYCHIATRY & NEUROLOGY

## 2024-04-04 PROCEDURE — 1111F DSCHRG MED/CURRENT MED MERGE: CPT | Performed by: PSYCHIATRY & NEUROLOGY

## 2024-04-04 RX ORDER — ACETAMINOPHEN 325 MG/1
650 TABLET ORAL
OUTPATIENT
Start: 2024-04-04

## 2024-04-04 RX ORDER — DIPHENHYDRAMINE HYDROCHLORIDE 50 MG/ML
25 INJECTION INTRAMUSCULAR; INTRAVENOUS ONCE
OUTPATIENT
Start: 2024-04-04 | End: 2024-04-04

## 2024-04-04 RX ORDER — ACETAMINOPHEN 325 MG/1
650 TABLET ORAL ONCE
OUTPATIENT
Start: 2024-04-04 | End: 2024-04-04

## 2024-04-04 RX ORDER — SODIUM CHLORIDE 9 MG/ML
INJECTION, SOLUTION INTRAVENOUS CONTINUOUS
OUTPATIENT
Start: 2024-04-04

## 2024-04-04 RX ORDER — ONDANSETRON 2 MG/ML
8 INJECTION INTRAMUSCULAR; INTRAVENOUS
OUTPATIENT
Start: 2024-04-04

## 2024-04-04 RX ORDER — SODIUM CHLORIDE 9 MG/ML
5-250 INJECTION, SOLUTION INTRAVENOUS PRN
OUTPATIENT
Start: 2024-04-04

## 2024-04-04 RX ORDER — SODIUM CHLORIDE 0.9 % (FLUSH) 0.9 %
5-40 SYRINGE (ML) INJECTION PRN
OUTPATIENT
Start: 2024-04-04

## 2024-04-04 RX ORDER — ALBUTEROL SULFATE 90 UG/1
4 AEROSOL, METERED RESPIRATORY (INHALATION) PRN
OUTPATIENT
Start: 2024-04-04

## 2024-04-04 RX ORDER — FAMOTIDINE 10 MG/ML
20 INJECTION, SOLUTION INTRAVENOUS
OUTPATIENT
Start: 2024-04-04

## 2024-04-04 RX ORDER — HEPARIN SODIUM (PORCINE) LOCK FLUSH IV SOLN 100 UNIT/ML 100 UNIT/ML
500 SOLUTION INTRAVENOUS PRN
OUTPATIENT
Start: 2024-04-04

## 2024-04-04 RX ORDER — EPINEPHRINE 1 MG/ML
0.3 INJECTION, SOLUTION, CONCENTRATE INTRAVENOUS PRN
OUTPATIENT
Start: 2024-04-04

## 2024-04-04 RX ORDER — DIPHENHYDRAMINE HYDROCHLORIDE 50 MG/ML
50 INJECTION INTRAMUSCULAR; INTRAVENOUS
OUTPATIENT
Start: 2024-04-04

## 2024-04-04 NOTE — PROGRESS NOTES
Review of Systems    Constitutional - No fever or chills.  No diaphoresis yes significant fatigue.  HENT -  No tinnitus or significant hearing loss.  Eyes - yes sudden vision change or eye pain  Respiratory - no significant shortness of breath or cough  Cardiovascular - no chest pain No palpitations or significant leg swelling  Gastrointestinal - no abdominal swelling or pain.    Genitourinary - No difficulty urinating, dysuria  Musculoskeletal - yes back pain or myalgia.  Skin - no color change or rash  Neurologic - No seizures.  No lateralizing weakness.  Hematologic - no easy bruising or excessive bleeding.  Psychiatric - no severe anxiety or nervousness.   All other review of systems are negative.

## 2024-04-04 NOTE — PROGRESS NOTES
Chief Complaint   Patient presents with    Follow-Up from Hospital       [place] Ryan Short is a 23 y.o. year old male with no significant past medical history was seen in the hospital in 3/24 for evaluation of left-sided optic neuritis.  Approximately 1 week prior to admission he woke up with blurring of vision in the left eye.  There was haziness not worsened with time.  He went to an ophthalmologist and workup appeared to be consistent with optic neuritis.  He denies some eye pain on the left with movement.  He denies diplopia, dysarthria, dysphagia, weakness, numbness, ataxia or confusion.  He denies any bowel or bladder issues.  He denies any previous history of neurological dysfunction.  He came in for evaluation.  There is no family history of neurological dysfunction. There is no family history of MS. his workup included MRI of the brain and orbits which revealed evidence of left optic neuritis along with periventricular white matter changes, several of which enhanced with contrast.  He received 3 days of high-dose Solu-Medrol along with an oral taper.  He did have a lumbar puncture but it is unclear if the order was placed correctly when reordered. He has issues with chronic low back pain.  His vision improved with the intravenous steroids but then worsened.    Active Ambulatory Problems     Diagnosis Date Noted    Optic neuritis 03/28/2024    Multiple sclerosis (HCC) 03/29/2024    Visual disturbance 03/29/2024     Resolved Ambulatory Problems     Diagnosis Date Noted    No Resolved Ambulatory Problems     No Additional Past Medical History       Past Surgical History:   Procedure Laterality Date    KNEE SURGERY Left        No family history on file.    No Known Allergies    Social History     Socioeconomic History    Marital status: Single     Spouse name: Not on file    Number of children: Not on file    Years of education: Not on file    Highest education level: Not on file   Occupational History    Not on

## 2024-04-05 LAB
HBV CORE AB SERPL QL IA: NEGATIVE
HBV E AB SERPL QL IA: NEGATIVE
HBV E AG SERPL QL IA: NEGATIVE
HBV SURFACE AG SERPL QL NT: NORMAL
IGA SERPL-MCNC: 83 MG/DL (ref 68–408)
IGG SERPL-MCNC: 1072 MG/DL (ref 768–1632)
IGM SERPL-MCNC: 90 MG/DL (ref 35–263)

## 2024-04-06 LAB
BACTERIA CSF CULT: NORMAL
BACTERIA SPEC ANAEROBE CULT: NORMAL
EEEV IGG TITR CSF IF: NORMAL {TITER}
GRAM STN SPEC: NORMAL
IGE SERPL-ACNC: 27 KU/L
LACV IGG TITR CSF IF: NORMAL {TITER}
SLEV IGG TITR CSF IF: NORMAL {TITER}
WEEV IGG TITR CSF IF: NORMAL {TITER}
WNV IGG CSF-ACNC: 0.07 IV

## 2024-04-07 LAB
ALB CSF/SERPL: 6 RATIO (ref 0–9)
ALBUMIN CSF-MCNC: 24 MG/DL (ref 0–35)
ALBUMIN SERPL-MCNC: 4012 MG/DL (ref 3500–5200)
HCV RNA SERPL NAA+PROBE-ACNC: NOT DETECTED IU/ML
HCV RNA SERPL NAA+PROBE-LOG IU: NOT DETECTED LOG IU/ML
HCV RNA SERPL QL NAA+PROBE: NOT DETECTED
IGG CSF-MCNC: 4.4 MG/DL (ref 0–6)
IGG SERPL-MCNC: 1044 MG/DL (ref 768–1632)
IGG SYNTH RATE SER+CSF CALC-MRATE: 4.2 MG/D
IGG/ALB CLEAR SER+CSF-RTO: 0.7 RATIO (ref 0.28–0.66)
IGG/ALB CSF: 0.18 RATIO (ref 0.09–0.25)
OLIGOCLONAL BANDS CSF ELPH-IMP: ABNORMAL
OLIGOCLONAL BANDS CSF ELPH-IMP: POSITIVE
OLIGOCLONAL BANDS CSF IEF: 9 BANDS (ref 0–1)

## 2024-04-08 LAB — AQP4 H2O CHANNEL AB SERPL IA-ACNC: <1.5 U/ML

## 2024-04-09 LAB
FUNGUS SPEC CULT: NORMAL
KOH PREP SPEC: NORMAL

## 2024-04-10 LAB
MISCELLANEOUS LAB TEST ORDER: ABNORMAL
MISCELLANEOUS LAB TEST RESULT: NORMAL

## 2024-04-11 ENCOUNTER — CLINICAL DOCUMENTATION (OUTPATIENT)
Facility: HOSPITAL | Age: 24
End: 2024-04-11

## 2024-04-11 NOTE — PROGRESS NOTES
Patient Assistance    Met with: Patient    Navigator Type: Infusion  Documentation Type: New Patient  Contact Type: Telephone  Navigation Status: New Patient  Status of Patient Insurance Coverage: Patient has active coverage          Additional notes: Called and introduced myself to Fuentes and went over his health benefits.  He has $968.29 left on his OOP Max.  He agreed to let me fill out the application for a copay card for Ocrevus.  He will go into the office to sign the application once it is approved with his insurance.  I will reach out to prior authorizations to see how much longer it should be before we have an approval.    Drug Name: Ocrevus  Form of PAP Assistance: Copay - Pending

## 2024-04-12 ENCOUNTER — TELEPHONE (OUTPATIENT)
Dept: NEUROLOGY | Age: 24
End: 2024-04-12

## 2024-04-12 NOTE — TELEPHONE ENCOUNTER
Patients friend called letting us know new symptoms, his arms & legs went numb last night.   Was last seen 4/4/24

## 2024-04-15 ENCOUNTER — TELEPHONE (OUTPATIENT)
Dept: NEUROLOGY | Age: 24
End: 2024-04-15

## 2024-04-15 NOTE — TELEPHONE ENCOUNTER
Patient called requesting fax number related to disability papers. Patient requesting a call back.

## 2024-04-15 NOTE — TELEPHONE ENCOUNTER
Called the patient back to give them the fax number for our office. The patient didn't answer the call and left a voicemail explaining to the patient to give us a call back if they have any further questions.

## 2024-04-15 NOTE — TELEPHONE ENCOUNTER
Patient called with insurance company regarding a PA for medication. Pt is wanting the PA escalate to get medication for infusions on time before running out. Name of medication is OCRELIZUMAB 1MG. [employer] asking that the provider call them to escalate. Telephone: [phone] case reference number: [id number] Please advise.

## 2024-04-16 LAB
FUNGUS SPEC CULT: NORMAL
KOH PREP SPEC: NORMAL

## 2024-04-16 NOTE — TELEPHONE ENCOUNTER
Our office is not in change of the prior authorizations for infusion medications. I do see that the prior authorization has been submitted to the insurance company by our pre-cert team. They are waiting on a determination. Once there is a determination, (if it is approved) the infusion center will contact the patient to schedule.

## 2024-04-18 ENCOUNTER — PATIENT MESSAGE (OUTPATIENT)
Dept: NEUROLOGY | Age: 24
End: 2024-04-18

## 2024-04-22 RX ORDER — ACETAMINOPHEN 325 MG/1
650 TABLET ORAL ONCE
OUTPATIENT
Start: 2024-04-22 | End: 2024-04-22

## 2024-04-22 RX ORDER — SODIUM CHLORIDE 9 MG/ML
5-250 INJECTION, SOLUTION INTRAVENOUS ONCE
OUTPATIENT
Start: 2024-04-22 | End: 2024-04-22

## 2024-04-22 RX ORDER — DIPHENHYDRAMINE HYDROCHLORIDE 50 MG/ML
50 INJECTION INTRAMUSCULAR; INTRAVENOUS ONCE
OUTPATIENT
Start: 2024-04-22 | End: 2024-04-22

## 2024-04-23 LAB
FUNGUS SPEC CULT: NORMAL
KOH PREP SPEC: NORMAL

## 2024-04-23 NOTE — TELEPHONE ENCOUNTER
Patient called the office back today to check to see if we received the paperwork. After speaking with kirit we have received it and will get completed and faxed off for the patient. I informed the patient of this information.

## 2024-04-29 ENCOUNTER — HOSPITAL ENCOUNTER (OUTPATIENT)
Dept: MRI IMAGING | Age: 24
Discharge: HOME | End: 2024-04-29
Payer: COMMERCIAL

## 2024-04-29 DIAGNOSIS — H53.9 VISUAL DISTURBANCE: ICD-10-CM

## 2024-04-29 DIAGNOSIS — G35 MULTIPLE SCLEROSIS (HCC): ICD-10-CM

## 2024-04-29 DIAGNOSIS — H46.9 OPTIC NEURITIS: ICD-10-CM

## 2024-04-29 PROCEDURE — A9577 INJ MULTIHANCE: HCPCS | Performed by: PSYCHIATRY & NEUROLOGY

## 2024-04-29 PROCEDURE — 72157 MRI CHEST SPINE W/O & W/DYE: CPT

## 2024-04-29 PROCEDURE — 6360000004 HC RX CONTRAST MEDICATION: Performed by: PSYCHIATRY & NEUROLOGY

## 2024-04-29 PROCEDURE — 72156 MRI NECK SPINE W/O & W/DYE: CPT

## 2024-04-29 RX ADMIN — GADOBENATE DIMEGLUMINE 20 ML: 529 INJECTION, SOLUTION INTRAVENOUS at 12:16

## 2024-04-30 ENCOUNTER — HOSPITAL ENCOUNTER (OUTPATIENT)
Dept: INFUSION THERAPY | Age: 24
Setting detail: INFUSION SERIES
Discharge: HOME | End: 2024-04-30
Payer: COMMERCIAL

## 2024-04-30 VITALS
RESPIRATION RATE: 18 BRPM | DIASTOLIC BLOOD PRESSURE: 74 MMHG | TEMPERATURE: 97.9 F | BODY MASS INDEX: 32.88 KG/M2 | OXYGEN SATURATION: 97 % | WEIGHT: 263.06 LBS | HEART RATE: 82 BPM | SYSTOLIC BLOOD PRESSURE: 130 MMHG

## 2024-04-30 DIAGNOSIS — G35 MULTIPLE SCLEROSIS (HCC): Primary | ICD-10-CM

## 2024-04-30 LAB
FUNGUS SPEC CULT: NORMAL
KOH PREP SPEC: NORMAL

## 2024-04-30 PROCEDURE — 6360000002 HC RX W HCPCS: Performed by: PSYCHIATRY & NEUROLOGY

## 2024-04-30 PROCEDURE — 2580000003 HC RX 258: Performed by: PSYCHIATRY & NEUROLOGY

## 2024-04-30 PROCEDURE — 96415 CHEMO IV INFUSION ADDL HR: CPT

## 2024-04-30 PROCEDURE — 6370000000 HC RX 637 (ALT 250 FOR IP): Performed by: PSYCHIATRY & NEUROLOGY

## 2024-04-30 PROCEDURE — 96413 CHEMO IV INFUSION 1 HR: CPT

## 2024-04-30 RX ORDER — DIPHENHYDRAMINE HYDROCHLORIDE 50 MG/ML
50 INJECTION INTRAMUSCULAR; INTRAVENOUS ONCE
OUTPATIENT
Start: 2024-05-14 | End: 2024-05-14

## 2024-04-30 RX ORDER — ALBUTEROL SULFATE 90 UG/1
4 AEROSOL, METERED RESPIRATORY (INHALATION) PRN
OUTPATIENT
Start: 2024-05-14

## 2024-04-30 RX ORDER — SODIUM CHLORIDE 9 MG/ML
5-250 INJECTION, SOLUTION INTRAVENOUS ONCE
Status: COMPLETED | OUTPATIENT
Start: 2024-04-30 | End: 2024-04-30

## 2024-04-30 RX ORDER — SODIUM CHLORIDE 0.9 % (FLUSH) 0.9 %
5-40 SYRINGE (ML) INJECTION PRN
Status: DISCONTINUED | OUTPATIENT
Start: 2024-04-30 | End: 2024-05-01 | Stop reason: HOSPADM

## 2024-04-30 RX ORDER — SODIUM CHLORIDE 9 MG/ML
INJECTION, SOLUTION INTRAVENOUS CONTINUOUS
OUTPATIENT
Start: 2024-05-14

## 2024-04-30 RX ORDER — DIPHENHYDRAMINE HYDROCHLORIDE 50 MG/ML
50 INJECTION INTRAMUSCULAR; INTRAVENOUS ONCE
Status: COMPLETED | OUTPATIENT
Start: 2024-04-30 | End: 2024-04-30

## 2024-04-30 RX ORDER — DIPHENHYDRAMINE HYDROCHLORIDE 50 MG/ML
50 INJECTION INTRAMUSCULAR; INTRAVENOUS
OUTPATIENT
Start: 2024-05-14

## 2024-04-30 RX ORDER — HEPARIN 100 UNIT/ML
500 SYRINGE INTRAVENOUS PRN
OUTPATIENT
Start: 2024-05-14

## 2024-04-30 RX ORDER — ACETAMINOPHEN 325 MG/1
650 TABLET ORAL
OUTPATIENT
Start: 2024-05-14

## 2024-04-30 RX ORDER — SODIUM CHLORIDE 9 MG/ML
5-250 INJECTION, SOLUTION INTRAVENOUS PRN
OUTPATIENT
Start: 2024-05-14

## 2024-04-30 RX ORDER — EPINEPHRINE 1 MG/ML
0.3 INJECTION, SOLUTION, CONCENTRATE INTRAVENOUS PRN
OUTPATIENT
Start: 2024-05-14

## 2024-04-30 RX ORDER — SODIUM CHLORIDE 0.9 % (FLUSH) 0.9 %
5-40 SYRINGE (ML) INJECTION PRN
OUTPATIENT
Start: 2024-05-14

## 2024-04-30 RX ORDER — ACETAMINOPHEN 325 MG/1
650 TABLET ORAL ONCE
OUTPATIENT
Start: 2024-05-14 | End: 2024-05-14

## 2024-04-30 RX ORDER — ACETAMINOPHEN 325 MG/1
650 TABLET ORAL ONCE
Status: COMPLETED | OUTPATIENT
Start: 2024-04-30 | End: 2024-04-30

## 2024-04-30 RX ORDER — ONDANSETRON 2 MG/ML
8 INJECTION INTRAMUSCULAR; INTRAVENOUS
OUTPATIENT
Start: 2024-05-14

## 2024-04-30 RX ORDER — SODIUM CHLORIDE 9 MG/ML
5-250 INJECTION, SOLUTION INTRAVENOUS ONCE
OUTPATIENT
Start: 2024-05-14 | End: 2024-05-14

## 2024-04-30 RX ADMIN — DIPHENHYDRAMINE HYDROCHLORIDE 50 MG: 50 INJECTION INTRAMUSCULAR; INTRAVENOUS at 07:53

## 2024-04-30 RX ADMIN — ACETAMINOPHEN 650 MG: 325 TABLET ORAL at 07:49

## 2024-04-30 RX ADMIN — WATER 125 MG: 1 INJECTION INTRAMUSCULAR; INTRAVENOUS; SUBCUTANEOUS at 07:51

## 2024-04-30 RX ADMIN — SODIUM CHLORIDE, PRESERVATIVE FREE 20 ML: 5 INJECTION INTRAVENOUS at 12:21

## 2024-04-30 RX ADMIN — SODIUM CHLORIDE, PRESERVATIVE FREE 10 ML: 5 INJECTION INTRAVENOUS at 07:52

## 2024-04-30 RX ADMIN — OCRELIZUMAB 300 MG: 300 INJECTION INTRAVENOUS at 08:06

## 2024-04-30 RX ADMIN — SODIUM CHLORIDE 20 ML/HR: 9 INJECTION, SOLUTION INTRAVENOUS at 07:51

## 2024-04-30 NOTE — PROGRESS NOTES
Orevus 300 mg Induction dose given per MD orders. Pt tolerated well. Patient monitored x 1 hour. Patient discharged home with mother.

## 2024-04-30 NOTE — DISCHARGE INSTRUCTIONS
Ocrelizumab Injection (OCRELIZUMAB - INJECTION)  For multiple sclerosis.  Brand Name(s): Ocrevus  Generic Name: Ocrelizumab  Instructions  This medicine is given as an IV injection into a vein.  This medicine should be given by a trained health care provider.  You must be monitored by a healthcare professional for at least 1 hour after each dose is given.  It is important that you keep taking each dose of this medicine on time even if you are feeling well.  If you miss a dose, contact your doctor for instructions.  Drug interactions can change how medicines work or increase risk for side effects. Tell your health care providers about all medicines taken. Include prescription and over-the-counter medicines, vitamins, and herbal medicines. Speak with your doctor or pharmacist before starting or stopping any medicine.  Tell your doctor if symptoms do not get better or if they get worse.  Your doctor may prescribe other medications to reduce side effects. Follow instructions carefully.  Keep all appointments for medical exams and tests while on this medicine.  Cautions  Tell your doctor and pharmacist if you ever had an allergic reaction to a medicine.  Your doctor should check you for hepatitis before you start this medicine and while you are using it. Tell your doctor if you are being treated for hepatitis or any other infection.  Some patients on this medicine have developed severe, life-threatening infections. Please speak with your doctor about the risks and benefits of using this medicine.  This medicine may increase the risk of cancer. Ask your doctor about the benefits and risks.  This medicine may cause dizziness or fainting, especially after exercising or in hot weather. Be very careful when standing or sitting up quickly.  Your ability to stay alert or to react quickly may be impaired by this medicine. Do not drive or operate machinery until you know how this medicine will affect you.  Please check with  your doctor before drinking alcohol while on this medicine.  If possible, avoid using with marijuana or other medicines that can cause dizziness or drowsiness. These include allergy/cold products, muscle relaxers, sleep aids, and pain relievers.  Call the doctor if there are any signs of confusion or unusual changes in behavior.  This medicine may reduce your body's ability to fight infections. Avoid contact with people with colds, flu or other infections. Contact your doctor if you develop fever, cough, sore throat, or chills.  Speak with your health care provider before receiving any vaccinations.  It is unknown if this medicine passes into breast milk. Ask your doctor before breastfeeding.  This medicine can hurt a new baby in the womb. If you become pregnant while on this medicine, tell your doctor immediately. Your doctor may switch you to a different medicine.  Women must use reliable forms of birth control while taking this medicine and for 6 months after stopping to prevent pregnancy.  Some patients have serious side effects from this medicine. Ask your pharmacist to show you the information from the Food and Drug Administration (FDA) and discuss it with you.  Side Effects  The following is a list of some common side effects from this medicine. Please speak with your doctor about what you should do if you experience these or other side effects.  coughing  pain, redness, swelling near injection  stuffy nose  Call your doctor or get medical help right away if you notice any of these more serious side effects:  severe or persistent abdominal pain  severe allergic reaction  loss of balance  breast lump or discharge  breast pain or swelling  difficulty concentrating  confusion  cough that does not go away  severe, watery or bloody diarrhea  dizziness  fainting  feeling of heat or flushing  fever or chills  severe or persistent headache  fast or irregular heart beats  itching  memory problems or loss  muscle

## 2024-05-14 ENCOUNTER — HOSPITAL ENCOUNTER (OUTPATIENT)
Dept: INFUSION THERAPY | Age: 24
Setting detail: INFUSION SERIES
Discharge: HOME | End: 2024-05-14
Payer: COMMERCIAL

## 2024-05-14 VITALS
RESPIRATION RATE: 18 BRPM | DIASTOLIC BLOOD PRESSURE: 78 MMHG | TEMPERATURE: 98.4 F | SYSTOLIC BLOOD PRESSURE: 143 MMHG | HEART RATE: 83 BPM | OXYGEN SATURATION: 98 %

## 2024-05-14 DIAGNOSIS — G35 MULTIPLE SCLEROSIS (HCC): Primary | ICD-10-CM

## 2024-05-14 PROCEDURE — 96375 TX/PRO/DX INJ NEW DRUG ADDON: CPT

## 2024-05-14 PROCEDURE — 6370000000 HC RX 637 (ALT 250 FOR IP): Performed by: PSYCHIATRY & NEUROLOGY

## 2024-05-14 PROCEDURE — 96415 CHEMO IV INFUSION ADDL HR: CPT

## 2024-05-14 PROCEDURE — 2580000003 HC RX 258: Performed by: PSYCHIATRY & NEUROLOGY

## 2024-05-14 PROCEDURE — 6360000002 HC RX W HCPCS: Performed by: PSYCHIATRY & NEUROLOGY

## 2024-05-14 PROCEDURE — 96413 CHEMO IV INFUSION 1 HR: CPT

## 2024-05-14 RX ORDER — DIPHENHYDRAMINE HYDROCHLORIDE 50 MG/ML
50 INJECTION INTRAMUSCULAR; INTRAVENOUS ONCE
Status: COMPLETED | OUTPATIENT
Start: 2024-05-14 | End: 2024-05-14

## 2024-05-14 RX ORDER — DIPHENHYDRAMINE HYDROCHLORIDE 50 MG/ML
50 INJECTION INTRAMUSCULAR; INTRAVENOUS ONCE
OUTPATIENT
Start: 2024-10-29 | End: 2024-10-29

## 2024-05-14 RX ORDER — DIPHENHYDRAMINE HYDROCHLORIDE 50 MG/ML
50 INJECTION INTRAMUSCULAR; INTRAVENOUS
OUTPATIENT
Start: 2024-10-29

## 2024-05-14 RX ORDER — ACETAMINOPHEN 325 MG/1
650 TABLET ORAL ONCE
Status: COMPLETED | OUTPATIENT
Start: 2024-05-14 | End: 2024-05-14

## 2024-05-14 RX ORDER — SODIUM CHLORIDE 0.9 % (FLUSH) 0.9 %
5-40 SYRINGE (ML) INJECTION PRN
OUTPATIENT
Start: 2024-10-29

## 2024-05-14 RX ORDER — SODIUM CHLORIDE 9 MG/ML
5-250 INJECTION, SOLUTION INTRAVENOUS ONCE
Status: COMPLETED | OUTPATIENT
Start: 2024-05-14 | End: 2024-05-14

## 2024-05-14 RX ORDER — ACETAMINOPHEN 325 MG/1
650 TABLET ORAL
OUTPATIENT
Start: 2024-10-29

## 2024-05-14 RX ORDER — SODIUM CHLORIDE 9 MG/ML
5-250 INJECTION, SOLUTION INTRAVENOUS ONCE
OUTPATIENT
Start: 2024-10-29 | End: 2024-10-29

## 2024-05-14 RX ORDER — HEPARIN 100 UNIT/ML
500 SYRINGE INTRAVENOUS PRN
OUTPATIENT
Start: 2024-10-29

## 2024-05-14 RX ORDER — SODIUM CHLORIDE 9 MG/ML
5-250 INJECTION, SOLUTION INTRAVENOUS PRN
OUTPATIENT
Start: 2024-10-29

## 2024-05-14 RX ORDER — ONDANSETRON 2 MG/ML
8 INJECTION INTRAMUSCULAR; INTRAVENOUS
OUTPATIENT
Start: 2024-10-29

## 2024-05-14 RX ORDER — SODIUM CHLORIDE 9 MG/ML
INJECTION, SOLUTION INTRAVENOUS CONTINUOUS
OUTPATIENT
Start: 2024-10-29

## 2024-05-14 RX ORDER — ALBUTEROL SULFATE 90 UG/1
4 AEROSOL, METERED RESPIRATORY (INHALATION) PRN
OUTPATIENT
Start: 2024-10-29

## 2024-05-14 RX ORDER — ACETAMINOPHEN 325 MG/1
650 TABLET ORAL ONCE
OUTPATIENT
Start: 2024-10-29 | End: 2024-10-29

## 2024-05-14 RX ORDER — EPINEPHRINE 1 MG/ML
0.3 INJECTION, SOLUTION, CONCENTRATE INTRAVENOUS PRN
OUTPATIENT
Start: 2024-10-29

## 2024-05-14 RX ADMIN — ACETAMINOPHEN 650 MG: 325 TABLET ORAL at 09:08

## 2024-05-14 RX ADMIN — DIPHENHYDRAMINE HYDROCHLORIDE 50 MG: 50 INJECTION INTRAMUSCULAR; INTRAVENOUS at 09:08

## 2024-05-14 RX ADMIN — WATER 125 MG: 1 INJECTION INTRAMUSCULAR; INTRAVENOUS; SUBCUTANEOUS at 09:08

## 2024-05-14 RX ADMIN — OCRELIZUMAB 300 MG: 300 INJECTION INTRAVENOUS at 09:37

## 2024-05-14 RX ADMIN — SODIUM CHLORIDE 20 ML/HR: 9 INJECTION, SOLUTION INTRAVENOUS at 09:17

## 2024-05-14 NOTE — FLOWSHEET NOTE
05/14/24 1238   AVS Reviewed   AVS & discharge instructions reviewed with patient and/or representative? Yes   Reviewed instructions with Patient   Level of Understanding Questions answered;Verbalized understanding

## 2024-05-14 NOTE — DISCHARGE INSTRUCTIONS
professional should be consulted before taking any drug, changing any diet or commencing or discontinuing any course of treatment. The display and use of this drug information is subject to express Terms of Use.  Care instructions adapted under license by [employer]. If you have questions about a medical condition or this instruction, always ask your healthcare professional. [employer] disclaims any warranty or liability for your use of this information.

## 2024-06-17 DIAGNOSIS — G43.001 MIGRAINE WITHOUT AURA AND WITH STATUS MIGRAINOSUS, NOT INTRACTABLE: Primary | ICD-10-CM

## 2024-06-21 ENCOUNTER — TELEPHONE (OUTPATIENT)
Dept: NEUROLOGY | Age: 24
End: 2024-06-21

## 2024-06-21 NOTE — TELEPHONE ENCOUNTER
Puja from [facility] stated that the pt was contacted to schedule Vyepti infusions and was very concerned. I checked the chart and we do not treat the pt for Migraines and there was no note about the patient wanting Vyepti infusions for migraines so I instructed Puja to cancel the referral. She voiced understanding.

## 2024-08-08 ENCOUNTER — OFFICE VISIT (OUTPATIENT)
Dept: NEUROLOGY | Age: 24
End: 2024-08-08
Payer: COMMERCIAL

## 2024-08-08 VITALS
HEIGHT: 75 IN | SYSTOLIC BLOOD PRESSURE: 133 MMHG | HEART RATE: 78 BPM | WEIGHT: 263 LBS | BODY MASS INDEX: 32.7 KG/M2 | DIASTOLIC BLOOD PRESSURE: 83 MMHG

## 2024-08-08 DIAGNOSIS — G35 MULTIPLE SCLEROSIS (HCC): Primary | ICD-10-CM

## 2024-08-08 DIAGNOSIS — H53.9 VISUAL DISTURBANCE: ICD-10-CM

## 2024-08-08 DIAGNOSIS — H46.9 OPTIC NEURITIS: ICD-10-CM

## 2024-08-08 PROCEDURE — 99214 OFFICE O/P EST MOD 30 MIN: CPT | Performed by: PSYCHIATRY & NEUROLOGY

## 2024-08-08 NOTE — PROGRESS NOTES
Review of Systems    Constitutional - No fever or chills.  No diaphoresis yes significant fatigue.  HENT -  No tinnitus or significant hearing loss.  Eyes - no sudden vision change or eye pain  Respiratory - no significant shortness of breath or cough  Cardiovascular - yes chest pain yes palpitations or significant leg swelling  Gastrointestinal - no abdominal swelling or pain.    Genitourinary - No difficulty urinating, dysuria  Musculoskeletal - yes back pain or myalgia.  Skin - no color change or rash  Neurologic - No seizures.  No lateralizing weakness.  Hematologic - no easy bruising or excessive bleeding.  Psychiatric - yes severe anxiety or nervousness.   All other review of systems are negative.    
of children: Not on file    Years of education: Not on file    Highest education level: Not on file   Occupational History    Not on file   Tobacco Use    Smoking status: Never    Smokeless tobacco: Never   Vaping Use    Vaping Use: Never used   Substance and Sexual Activity    Alcohol use: Not Currently    Drug use: Not Currently    Sexual activity: Not on file   Other Topics Concern    Not on file   Social History Narrative    Not on file     Social Determinants of Health     Financial Resource Strain: Not on file   Food Insecurity: No Food Insecurity (3/28/2024)    Hunger Vital Sign     Worried About Running Out of Food in the Last Year: Never true     Ran Out of Food in the Last Year: Never true   Transportation Needs: No Transportation Needs (3/28/2024)    PRAPARE - Transportation     Lack of Transportation (Medical): No     Lack of Transportation (Non-Medical): No   Physical Activity: Not on file   Stress: Not on file   Social Connections: Not on file   Intimate Partner Violence: Not on file   Housing Stability: Low Risk  (3/28/2024)    Housing Stability Vital Sign     Unable to Pay for Housing in the Last Year: No     Number of Places Lived in the Last Year: 1     Unstable Housing in the Last Year: No       Review of Systems     Constitutional - No fever or chills.  No diaphoresis yes significant fatigue.  HENT -  No tinnitus or significant hearing loss.  Eyes - no sudden vision change or eye pain  Respiratory - no significant shortness of breath or cough  Cardiovascular - yes chest pain yes palpitations or significant leg swelling  Gastrointestinal - no abdominal swelling or pain.    Genitourinary - No difficulty urinating, dysuria  Musculoskeletal - yes back pain or myalgia.  Skin - no color change or rash  Neurologic - No seizures.  No lateralizing weakness.  Hematologic - no easy bruising or excessive bleeding.  Psychiatric - yes severe anxiety or nervousness.   All other review of systems are

## 2024-08-09 ENCOUNTER — TELEPHONE (OUTPATIENT)
Dept: NEUROLOGY | Age: 24
End: 2024-08-09

## 2024-08-09 NOTE — TELEPHONE ENCOUNTER
Patient called to see if his paper work to return back to work on Monday was filled out and faxed back today. Patient said that his work faxed paper over this morning.  Please called patient.      Thank you

## 2024-12-10 ENCOUNTER — OFFICE VISIT (OUTPATIENT)
Dept: NEUROLOGY | Age: 24
End: 2024-12-10
Payer: COMMERCIAL

## 2024-12-10 VITALS
HEIGHT: 75 IN | DIASTOLIC BLOOD PRESSURE: 97 MMHG | SYSTOLIC BLOOD PRESSURE: 145 MMHG | BODY MASS INDEX: 32.7 KG/M2 | WEIGHT: 263 LBS | HEART RATE: 97 BPM

## 2024-12-10 DIAGNOSIS — M54.50 LOW BACK PAIN, UNSPECIFIED BACK PAIN LATERALITY, UNSPECIFIED CHRONICITY, UNSPECIFIED WHETHER SCIATICA PRESENT: ICD-10-CM

## 2024-12-10 DIAGNOSIS — G35 MULTIPLE SCLEROSIS (HCC): Primary | ICD-10-CM

## 2024-12-10 DIAGNOSIS — H46.9 OPTIC NEURITIS: ICD-10-CM

## 2024-12-10 DIAGNOSIS — R53.83 OTHER FATIGUE: ICD-10-CM

## 2024-12-10 DIAGNOSIS — H53.9 VISUAL DISTURBANCE: ICD-10-CM

## 2024-12-10 PROCEDURE — 99214 OFFICE O/P EST MOD 30 MIN: CPT | Performed by: PSYCHIATRY & NEUROLOGY

## 2024-12-10 RX ORDER — MODAFINIL 200 MG/1
200 TABLET ORAL DAILY
Qty: 30 TABLET | Refills: 5 | Status: SHIPPED | OUTPATIENT
Start: 2024-12-10 | End: 2025-01-09

## 2024-12-10 RX ORDER — LIDOCAINE 50 MG/G
1 PATCH TOPICAL DAILY
Qty: 30 PATCH | Refills: 5 | Status: SHIPPED | OUTPATIENT
Start: 2024-12-10 | End: 2025-01-09

## 2024-12-10 NOTE — PROGRESS NOTES
Chief Complaint   Patient presents with    Multiple Sclerosis       Fuentes Short is a 23 y.o. year old male with no significant past medical history was seen in the hospital in 3/24 for evaluation of left-sided optic neuritis.  Approximately 1 week prior to admission he woke up with blurring of vision in the left eye.  There was haziness not worsened with time.  He went to an ophthalmologist and workup appeared to be consistent with optic neuritis.  He denies some eye pain on the left with movement.  He denies diplopia, dysarthria, dysphagia, weakness, numbness, ataxia or confusion.  He denies any bowel or bladder issues.  He denies any previous history of neurological dysfunction.  He came in for evaluation.  There is no family history of neurological dysfunction. There is no family history of MS. his workup included MRI of the brain and orbits which revealed evidence of left optic neuritis along with periventricular white matter changes, several of which enhanced with contrast.  He received 3 days of high-dose Solu-Medrol along with an oral taper.  He did have a lumbar puncture but it is unclear if the order was placed correctly when reordered. He has issues with chronic low back pain.  His vision improved with the intravenous steroids but then worsened.Started  Ocrevus stated March 2024.Legs feels heavy or numb.more low back pain. More fatigue. More memory issues with and stuttering.    Active Ambulatory Problems     Diagnosis Date Noted    Optic neuritis 03/28/2024    Multiple sclerosis (HCC) 03/29/2024    Visual disturbance 03/29/2024     Resolved Ambulatory Problems     Diagnosis Date Noted    No Resolved Ambulatory Problems     No Additional Past Medical History       Past Surgical History:   Procedure Laterality Date    KNEE SURGERY Left        No family history on file.    No Known Allergies    Social History     Socioeconomic History    Marital status: Single     Spouse name: Not on file    Number of

## 2024-12-18 ENCOUNTER — HOSPITAL ENCOUNTER (OUTPATIENT)
Dept: MRI IMAGING | Age: 24
Discharge: HOME | End: 2024-12-18
Attending: PSYCHIATRY & NEUROLOGY
Payer: COMMERCIAL

## 2024-12-18 DIAGNOSIS — M54.50 LOW BACK PAIN, UNSPECIFIED BACK PAIN LATERALITY, UNSPECIFIED CHRONICITY, UNSPECIFIED WHETHER SCIATICA PRESENT: ICD-10-CM

## 2024-12-18 PROCEDURE — 72148 MRI LUMBAR SPINE W/O DYE: CPT

## 2025-04-15 ENCOUNTER — OFFICE VISIT (OUTPATIENT)
Dept: NEUROLOGY | Age: 25
End: 2025-04-15

## 2025-04-15 VITALS
BODY MASS INDEX: 32.7 KG/M2 | WEIGHT: 263 LBS | HEIGHT: 75 IN | DIASTOLIC BLOOD PRESSURE: 98 MMHG | HEART RATE: 82 BPM | SYSTOLIC BLOOD PRESSURE: 156 MMHG

## 2025-04-15 DIAGNOSIS — Z79.899 ON LONG TERM DRUG THERAPY: Primary | ICD-10-CM

## 2025-04-15 DIAGNOSIS — M54.50 LOW BACK PAIN, UNSPECIFIED BACK PAIN LATERALITY, UNSPECIFIED CHRONICITY, UNSPECIFIED WHETHER SCIATICA PRESENT: ICD-10-CM

## 2025-04-15 DIAGNOSIS — G35 MULTIPLE SCLEROSIS (HCC): ICD-10-CM

## 2025-04-15 DIAGNOSIS — R53.83 OTHER FATIGUE: ICD-10-CM

## 2025-04-15 DIAGNOSIS — H46.9 OPTIC NEURITIS: ICD-10-CM

## 2025-04-15 DIAGNOSIS — H53.9 VISUAL DISTURBANCE: ICD-10-CM

## 2025-04-15 LAB
ALBUMIN SERPL-MCNC: 4.5 G/DL (ref 3.5–5.2)
ALP SERPL-CCNC: 70 U/L (ref 40–129)
ALT SERPL-CCNC: 53 U/L (ref 10–50)
ANION GAP SERPL CALCULATED.3IONS-SCNC: 9 MMOL/L (ref 8–16)
AST SERPL-CCNC: 29 U/L (ref 10–50)
BASOPHILS # BLD: 0.1 K/UL (ref 0–0.2)
BASOPHILS NFR BLD: 1.2 % (ref 0–1)
BILIRUB SERPL-MCNC: 0.3 MG/DL (ref 0.2–1.2)
BUN SERPL-MCNC: 11 MG/DL (ref 6–20)
CALCIUM SERPL-MCNC: 9.5 MG/DL (ref 8.6–10)
CHLORIDE SERPL-SCNC: 104 MMOL/L (ref 98–107)
CO2 SERPL-SCNC: 27 MMOL/L (ref 22–29)
CREAT SERPL-MCNC: 0.9 MG/DL (ref 0.7–1.2)
EOSINOPHIL # BLD: 0.2 K/UL (ref 0–0.6)
EOSINOPHIL NFR BLD: 2.2 % (ref 0–5)
ERYTHROCYTE [DISTWIDTH] IN BLOOD BY AUTOMATED COUNT: 12.6 % (ref 11.5–14.5)
GLUCOSE SERPL-MCNC: 88 MG/DL (ref 70–99)
HCT VFR BLD AUTO: 48.3 % (ref 42–52)
HGB BLD-MCNC: 16.4 G/DL (ref 14–18)
IMM GRANULOCYTES # BLD: 0 K/UL
LYMPHOCYTES # BLD: 1.6 K/UL (ref 1.1–4.5)
LYMPHOCYTES NFR BLD: 23.5 % (ref 20–40)
MCH RBC QN AUTO: 29.3 PG (ref 27–31)
MCHC RBC AUTO-ENTMCNC: 34 G/DL (ref 33–37)
MCV RBC AUTO: 86.4 FL (ref 80–94)
MONOCYTES # BLD: 0.7 K/UL (ref 0–0.9)
MONOCYTES NFR BLD: 10.7 % (ref 0–10)
NEUTROPHILS # BLD: 4.2 K/UL (ref 1.5–7.5)
NEUTS SEG NFR BLD: 62.1 % (ref 50–65)
PLATELET # BLD AUTO: 310 K/UL (ref 130–400)
PMV BLD AUTO: 9.4 FL (ref 9.4–12.4)
POTASSIUM SERPL-SCNC: 4.4 MMOL/L (ref 3.5–5)
PROT SERPL-MCNC: 7.2 G/DL (ref 6.4–8.3)
RBC # BLD AUTO: 5.59 M/UL (ref 4.7–6.1)
SODIUM SERPL-SCNC: 140 MMOL/L (ref 136–145)
WBC # BLD AUTO: 6.8 K/UL (ref 4.8–10.8)

## 2025-04-15 RX ORDER — METHYLPHENIDATE HYDROCHLORIDE 10 MG/1
TABLET ORAL
Qty: 60 TABLET | Refills: 0 | Status: SHIPPED | OUTPATIENT
Start: 2025-04-15 | End: 2025-05-15

## 2025-04-15 RX ORDER — MODAFINIL 200 MG/1
200 TABLET ORAL DAILY
COMMUNITY

## 2025-04-15 NOTE — PROGRESS NOTES
Chief Complaint   Patient presents with    Multiple Sclerosis       Fuentes Short is a 24 y.o. year old male with no significant past medical history was seen in the hospital in 3/24 for evaluation of left-sided optic neuritis.  Approximately 1 week prior to admission he woke up with blurring of vision in the left eye.  There was haziness not worsened with time.  He went to an ophthalmologist and workup appeared to be consistent with optic neuritis.  He denies some eye pain on the left with movement.  He denies diplopia, dysarthria, dysphagia, weakness, numbness, ataxia or confusion.  He denies any bowel or bladder issues.  He denies any previous history of neurological dysfunction.  He came in for evaluation.  There is no family history of neurological dysfunction. There is no family history of MS. his workup included MRI of the brain and orbits which revealed evidence of left optic neuritis along with periventricular white matter changes, several of which enhanced with contrast.  He received 3 days of high-dose Solu-Medrol along with an oral taper.  He did have a lumbar puncture but it is unclear if the order was placed correctly when reordered. He has issues with chronic low back pain.  His vision improved with the intravenous steroids but then worsened.Started  Ocrevus stated March 2024.Legs feels heavy or numb.more low back pain. More fatigue. More memory issues with and stuttering. A month ago more intermittent numbness  in arms and legs. Unclear benefit with Provigil.     Active Ambulatory Problems     Diagnosis Date Noted    Optic neuritis 03/28/2024    Multiple sclerosis (HCC) 03/29/2024    Visual disturbance 03/29/2024     Resolved Ambulatory Problems     Diagnosis Date Noted    No Resolved Ambulatory Problems     No Additional Past Medical History       Past Surgical History:   Procedure Laterality Date    KNEE SURGERY Left        No family history on file.    No Known Allergies    Social History

## 2025-04-16 LAB
ALCOHOL URINE: NORMAL
AMPHETAMINE SCREEN URINE: NORMAL
BARBITURATE SCREEN URINE: NORMAL
BENZODIAZEPINE SCREEN, URINE: NORMAL
BUPRENORPHINE URINE: NORMAL
COCAINE METABOLITE SCREEN URINE: NORMAL
FENTANYL SCREEN, URINE: NORMAL
GABAPENTIN SCREEN, URINE: NORMAL
MDMA, URINE: NORMAL
METHADONE SCREEN, URINE: NORMAL
METHAMPHETAMINE, URINE: NORMAL
OPIATE SCREEN URINE: NORMAL
OXYCODONE SCREEN URINE: NORMAL
PHENCYCLIDINE SCREEN URINE: NORMAL
PROPOXYPHENE SCREEN, URINE: NORMAL
SYNTHETIC CANNABINOIDS(K2) SCREEN, URINE: NORMAL
THC SCREEN, URINE: NORMAL
TRAMADOL SCREEN URINE: NORMAL
TRICYCLIC ANTIDEPRESSANTS, UR: NORMAL